# Patient Record
Sex: FEMALE | Race: OTHER | URBAN - METROPOLITAN AREA
[De-identification: names, ages, dates, MRNs, and addresses within clinical notes are randomized per-mention and may not be internally consistent; named-entity substitution may affect disease eponyms.]

---

## 2021-05-19 ENCOUNTER — OFFICE VISIT (OUTPATIENT)
Dept: PRIMARY CARE CLINIC | Age: 61
End: 2021-05-19

## 2021-05-19 VITALS
DIASTOLIC BLOOD PRESSURE: 68 MMHG | BODY MASS INDEX: 22.86 KG/M2 | SYSTOLIC BLOOD PRESSURE: 114 MMHG | OXYGEN SATURATION: 99 % | WEIGHT: 108.9 LBS | HEIGHT: 58 IN | HEART RATE: 76 BPM

## 2021-05-19 DIAGNOSIS — Z78.0 MENOPAUSE: ICD-10-CM

## 2021-05-19 DIAGNOSIS — K14.9 TONGUE DISORDER: Primary | ICD-10-CM

## 2021-05-19 DIAGNOSIS — M81.0 AGE-RELATED OSTEOPOROSIS WITHOUT CURRENT PATHOLOGICAL FRACTURE: ICD-10-CM

## 2021-05-19 PROCEDURE — 99203 OFFICE O/P NEW LOW 30 MIN: CPT | Performed by: FAMILY MEDICINE

## 2021-05-19 SDOH — ECONOMIC STABILITY: FOOD INSECURITY: WITHIN THE PAST 12 MONTHS, YOU WORRIED THAT YOUR FOOD WOULD RUN OUT BEFORE YOU GOT MONEY TO BUY MORE.: NEVER TRUE

## 2021-05-19 ASSESSMENT — ENCOUNTER SYMPTOMS
BACK PAIN: 0
CHEST TIGHTNESS: 0
WHEEZING: 0
COUGH: 0

## 2021-05-19 ASSESSMENT — PATIENT HEALTH QUESTIONNAIRE - PHQ9
SUM OF ALL RESPONSES TO PHQ QUESTIONS 1-9: 0
1. LITTLE INTEREST OR PLEASURE IN DOING THINGS: 0

## 2021-05-19 ASSESSMENT — SOCIAL DETERMINANTS OF HEALTH (SDOH): HOW HARD IS IT FOR YOU TO PAY FOR THE VERY BASICS LIKE FOOD, HOUSING, MEDICAL CARE, AND HEATING?: NOT HARD AT ALL

## 2021-05-19 NOTE — PROGRESS NOTES
SUBJECTIVE:  Patient ID: Leni Menjivar is a 62 y.o. female. Chief Complaint:  Chief Complaint   Patient presents with    Oral Pain     tongue pain x 2 wks increasing       HPI   62year old Female  First Time to [facility]  In with [address] [place] [address] of burning in her tongue x couple of month  Pain in her tongue when talk & when she eat  Tongue issue started after annual dental cleaning visit a year ago  No weight loss  Taste is fine   Smell is fine  No change in bowel habit   No sense of mucous   Hx Osteoporosis on weekly therapy    Current Outpatient Medications   Medication Sig Dispense Refill    Multiple Vitamin (MULTI-VITAMIN DAILY PO) Take by mouth daily      CALCIUM PO Take 600 mg by mouth daily       No current facility-administered medications for this visit. No results found for: WBC, HGB, HCT, MCV, PLT  No results found for: CHOL  No results found for: TRIG  No results found for: HDL  No results found for: LDLCHOLESTEROL, LDLCALC  No results found for: LABVLDL, VLDL  No results found for: CHOLHDLRATIO    Chemistry    No results found for: NA, K, CL, CO2, BUN, CREATININE No results found for: CALCIUM, ALKPHOS, AST, ALT, BILITOT         Review of Systems   Eyes:        Dry   Respiratory: Negative for cough, chest tightness and wheezing. Short of breath when walk long distance   Cardiovascular: Negative for chest pain, palpitations and leg swelling. Gastrointestinal:        No colonoscopy  No EGD  BM normal  Pure Vegetarian  denies any sense of acid or Reflux   Genitourinary: Negative for dysuria. Menopause age 49  No Mammogram  Last pelvic 2019 [facility]   Musculoskeletal: Negative for back pain and gait problem. Knee pain  Osteoporosis  Pain upper back & Both shoulder   Skin: Negative for rash. Neurological: Negative for dizziness and headaches. Psychiatric/Behavioral: Negative for behavioral problems.         Sleep 5-6 H  Good energy         OBJECTIVE:  /68 (Site: Right Upper Arm, Position: Sitting, Cuff Size: Medium Adult)   Pulse 76   Ht 4' 9.5\" (1.461 m)   Wt 108 lb 14.4 oz (49.4 kg)   SpO2 99%   BMI 23.16 kg/m²   Physical Exam  HENT:      Ears:      Comments: Cerumen both ear     Mouth/Throat:      Comments: Tongue some whitish area both side   Eyes:      Extraocular Movements: Extraocular movements intact. Pupils: Pupils are equal, round, and reactive to light. Cardiovascular:      Rate and Rhythm: Normal rate and regular rhythm. Heart sounds: Normal heart sounds. Pulmonary:      Effort: Pulmonary effort is normal.      Breath sounds: Normal breath sounds. No wheezing or rhonchi. Abdominal:      Palpations: Abdomen is soft. Musculoskeletal:      Cervical back: Normal range of motion and neck supple. Right lower leg: No edema. Left lower leg: No edema. Neurological:      General: No focal deficit present. Mental Status: She is alert and oriented to person, place, and time. ASSESSMENT/PLAN:      Diagnosis Orders   1. Tongue disorder  [facility]   2. Age-related osteoporosis without current pathological fracture     3. Menopause       ?  Lichen Planus of tongue  May need tongue Biopsy  Consider new lab & HCV screen  Advice due Mammogram & Colonoscopy  Last lab done [facility] 1/2021 to request a copy from [facility]   Pt has travel insurance not sure if it's just for emergency or cover anything   according pt last lab were wnl January 2021  Visit 30 minutes

## 2021-06-03 ENCOUNTER — OFFICE VISIT (OUTPATIENT)
Dept: ENT CLINIC | Age: 61
End: 2021-06-03

## 2021-06-03 VITALS
DIASTOLIC BLOOD PRESSURE: 70 MMHG | WEIGHT: 109 LBS | HEIGHT: 57 IN | SYSTOLIC BLOOD PRESSURE: 110 MMHG | BODY MASS INDEX: 23.51 KG/M2

## 2021-06-03 DIAGNOSIS — K13.21 LEUKOPLAKIA OF TONGUE: ICD-10-CM

## 2021-06-03 DIAGNOSIS — K14.0 GLOSSITIS: Primary | ICD-10-CM

## 2021-06-03 PROCEDURE — 99204 OFFICE O/P NEW MOD 45 MIN: CPT | Performed by: OTOLARYNGOLOGY

## 2021-06-03 ASSESSMENT — ENCOUNTER SYMPTOMS
SORE THROAT: 0
SINUS PAIN: 0
COUGH: 0
DIARRHEA: 0
NAUSEA: 0
SINUS PRESSURE: 0
BLOOD IN STOOL: 0
EYE ITCHING: 0
CHOKING: 0
TROUBLE SWALLOWING: 0
VOICE CHANGE: 0
EYE DISCHARGE: 0
VOMITING: 0
COLOR CHANGE: 0
STRIDOR: 0
BACK PAIN: 0
WHEEZING: 0
CONSTIPATION: 0
RHINORRHEA: 0
SHORTNESS OF BREATH: 0
FACIAL SWELLING: 0
PHOTOPHOBIA: 0

## 2021-06-03 NOTE — PROGRESS NOTES
used   Substance and Sexual Activity    Alcohol use: Not Currently    Drug use: Never    Sexual activity: Not on file   Other Topics Concern    Not on file   Social History Narrative            3 grown children    No Tobacco    No Alcohol     Social Determinants of Health     Financial Resource Strain: Low Risk     Difficulty of Paying Living Expenses: Not hard at all   Food Insecurity: No Food Insecurity    Worried About Running Out of Food in the Last Year: Never true    [address] in the Last Year: Never true   Transportation Needs:     Lack of Transportation (Medical):  Lack of Transportation (Non-Medical):    Physical Activity:     Days of Exercise per Week:     Minutes of Exercise per Session:    Stress:     Feeling of Stress :    Social Connections:     Frequency of Communication with Friends and Family:     Frequency of Social Gatherings with Friends and Family:     Attends Congregation Services:     Active Member of Clubs or Organizations:     Attends Club or Organization Meetings:     Marital Status:    Intimate Partner Violence:     Fear of Current or Ex-Partner:     Emotionally Abused:     Physically Abused:     Sexually Abused: Allergies     No Known Allergies    Medications     Current Outpatient Medications   Medication Sig Dispense Refill    Magic Mouthwash (MIRACLE MOUTHWASH) Lidocaine Viscous 2% Soln 24 ml; Diphenhydramine 12.5mg/5ml 120 mg 48ml; Nystatin 810973 units/ml 4,800,000 units 48 ml; prednisolone 15mg/5ml 46ml; 5 ml swish and spit 4 times per day 146 mL 1    Multiple Vitamin (MULTI-VITAMIN DAILY PO) Take by mouth daily      CALCIUM PO Take 600 mg by mouth daily       No current facility-administered medications for this visit. Review of Systems     Review of Systems   Constitutional: Negative for activity change, appetite change, chills, diaphoresis, fatigue, fever and unexpected weight change. HENT: Positive for mouth sores. Negative for congestion, dental problem, drooling, ear discharge, ear pain, facial swelling, hearing loss, nosebleeds, postnasal drip, rhinorrhea, sinus pressure, sinus pain, sneezing, sore throat, tinnitus, trouble swallowing and voice change. Eyes: Negative for photophobia, discharge, itching and visual disturbance. Respiratory: Negative for cough, choking, shortness of breath, wheezing and stridor. Gastrointestinal: Negative for blood in stool, constipation, diarrhea, nausea and vomiting. Endocrine: Negative for cold intolerance, heat intolerance, polyphagia and polyuria. Musculoskeletal: Negative for back pain, gait problem, neck pain and neck stiffness. Skin: Negative for color change, pallor, rash and wound. Neurological: Negative for dizziness, syncope, facial asymmetry, speech difficulty, light-headedness, numbness and headaches. Hematological: Negative for adenopathy. Does not bruise/bleed easily. Psychiatric/Behavioral: Negative for agitation, confusion and sleep disturbance. PhysicalExam     Vitals:    06/03/21 0905   BP: 110/70       Physical Exam  Constitutional:       Appearance: She is well-developed. HENT:      Head: Normocephalic and atraumatic. Not macrocephalic and not microcephalic. No raccoon eyes, Gooden's sign, abrasion, contusion, right periorbital erythema, left periorbital erythema or laceration. Hair is normal.      Jaw: No trismus. Right Ear: Hearing, tympanic membrane and external ear normal. No decreased hearing noted. No drainage, swelling or tenderness. No middle ear effusion. No mastoid tenderness. Tympanic membrane is not perforated, retracted or bulging. Tympanic membrane has normal mobility. Left Ear: Hearing, tympanic membrane and external ear normal. No decreased hearing noted. No drainage, swelling or tenderness. No middle ear effusion. No mastoid tenderness. Tympanic membrane is not perforated, retracted or bulging.  Tympanic membrane or rash. Neurological:      Mental Status: She is alert and oriented to person, place, and time. Cranial Nerves: No cranial nerve deficit. Psychiatric:         Speech: Speech normal.         Behavior: Behavior normal.           Procedure           Assessment and Plan     1. Glossitis  Patient has some small patches of leukoplakia and glossitis. The etiology of her symptoms is unclear. We will do a trial of the Magic mouthwash for 2 weeks. I will see her back in 3 weeks to assess improvement. If she is experiencing persistent symptoms, will likely perform biopsy. Concern for malignancy relatively low at this point given the low risk factors and appearance of the lesion, however cannot rule out unless definitive biopsy performed. - Magic Mouthwash (MIRACLE MOUTHWASH); Lidocaine Viscous 2% Soln 24 ml; Diphenhydramine 12.5mg/5ml 120 mg 48ml; Nystatin 811037 units/ml 4,800,000 units 48 ml; prednisolone 15mg/5ml 46ml; 5 ml swish and spit 4 times per day  Dispense: 146 mL; Refill: 1     2. Leukoplakia of tongue        Return in about 3 weeks (around 6/24/2021). Outside medical records reviewed. Outside laboratory results reviewed. Information obtained via  and son. Explained proper administration of medication. Patient and family member understand course of therapy. Portions of this note were dictated using Dragon.  There may be linguistic errors secondary to the use of this program.

## 2021-06-24 ENCOUNTER — OFFICE VISIT (OUTPATIENT)
Dept: ENT CLINIC | Age: 61
End: 2021-06-24

## 2021-06-24 VITALS
WEIGHT: 109 LBS | DIASTOLIC BLOOD PRESSURE: 64 MMHG | BODY MASS INDEX: 23.51 KG/M2 | HEIGHT: 57 IN | SYSTOLIC BLOOD PRESSURE: 100 MMHG | TEMPERATURE: 97.7 F

## 2021-06-24 DIAGNOSIS — K14.0 GLOSSITIS: Primary | ICD-10-CM

## 2021-06-24 DIAGNOSIS — K13.21 LEUKOPLAKIA OF TONGUE: ICD-10-CM

## 2021-06-24 PROCEDURE — 41100 BIOPSY OF TONGUE: CPT | Performed by: OTOLARYNGOLOGY

## 2021-06-24 PROCEDURE — 99213 OFFICE O/P EST LOW 20 MIN: CPT | Performed by: OTOLARYNGOLOGY

## 2021-06-24 ASSESSMENT — ENCOUNTER SYMPTOMS
SINUS PRESSURE: 0
VOICE CHANGE: 0
SHORTNESS OF BREATH: 0
COLOR CHANGE: 0
EYE ITCHING: 0
SINUS PAIN: 0
RHINORRHEA: 0
CHOKING: 0
EYE REDNESS: 0
PHOTOPHOBIA: 0
NAUSEA: 0
DIARRHEA: 0
TROUBLE SWALLOWING: 0
STRIDOR: 0
SORE THROAT: 0
EYE PAIN: 0
COUGH: 0
FACIAL SWELLING: 0

## 2021-06-24 NOTE — PROGRESS NOTES
[facility]  [address] [address] [facility], [address] [place] [address], [facility] [address]  P: [phone]  F: [phone]       Patient     Cammy Moralez  1960    ChiefComplaint     Chief Complaint   Patient presents with    Follow-up     Patient is here today for her 3 week follow up, there has been no change and the magic wash did not help as well       History of Present Illness     Cammy Moralez is a pleasant 62 y.o. female here for 3-week follow-up mouth pain, glossitis and leukoplakia. She completed the Magic mouthwash as instructed. She did not experience any improvement of symptoms. She now states she is also experiencing pain in her cheeks and her gums. She is here today for biopsy. Past Medical History     No past medical history on file. Past Surgical History     No past surgical history on file.     Family History     Family History   Problem Relation Age of Onset   Darcy Macie Other Mother         age 79    Other Father         age [de-identified]    Diabetes Sister     Diabetes Brother        Social History     Social History     Socioeconomic History    Marital status:      Spouse name: Not on file    Number of children: Not on file    Years of education: Not on file    Highest education level: Not on file   Occupational History    Not on file   Tobacco Use    Smoking status: Never Smoker    Smokeless tobacco: Never Used   Vaping Use    Vaping Use: Never used   Substance and Sexual Activity    Alcohol use: Not Currently    Drug use: Never    Sexual activity: Not on file   Other Topics Concern    Not on file   Social History Narrative            3 grown children    No Tobacco    No Alcohol     Social Determinants of Health     Financial Resource Strain: Low Risk     Difficulty of Paying Living Expenses: Not hard at all   Food Insecurity: No Food Insecurity    Worried About [facility] in the Last Year: Never true    [address] [facility] in the Last Year: Never true Transportation Needs:     Lack of Transportation (Medical):  Lack of Transportation (Non-Medical):    Physical Activity:     Days of Exercise per Week:     Minutes of Exercise per Session:    Stress:     Feeling of Stress :    Social Connections:     Frequency of Communication with Friends and Family:     Frequency of Social Gatherings with Friends and Family:     Attends Christianity Services:     Active Member of Clubs or Organizations:     Attends Club or Organization Meetings:     Marital Status:    Intimate Partner Violence:     Fear of Current or Ex-Partner:     Emotionally Abused:     Physically Abused:     Sexually Abused: Allergies     No Known Allergies    Medications     Current Outpatient Medications   Medication Sig Dispense Refill    Magic Mouthwash (MIRACLE MOUTHWASH) Lidocaine Viscous 2% Soln 24 ml; Diphenhydramine 12.5mg/5ml 120 mg 48ml; Nystatin 637143 units/ml 4,800,000 units 48 ml; prednisolone 15mg/5ml 46ml; 5 ml swish and spit 4 times per day 146 mL 1    Multiple Vitamin (MULTI-VITAMIN DAILY PO) Take by mouth daily      CALCIUM PO Take 600 mg by mouth daily       No current facility-administered medications for this visit. Review of Systems     Review of Systems   Constitutional: Negative for chills, fatigue and fever. HENT: Positive for dental problem and mouth sores. Negative for congestion, ear discharge, ear pain, facial swelling, hearing loss, nosebleeds, postnasal drip, rhinorrhea, sinus pressure, sinus pain, sneezing, sore throat, tinnitus, trouble swallowing and voice change. Eyes: Negative for photophobia, pain, redness, itching and visual disturbance. Respiratory: Negative for cough, choking, shortness of breath and stridor. Gastrointestinal: Negative for diarrhea and nausea. Musculoskeletal: Negative for neck pain and neck stiffness. Skin: Negative for color change and rash.    Neurological: Negative for dizziness, facial asymmetry and light-headedness. Hematological: Negative for adenopathy. Psychiatric/Behavioral: Negative for agitation and confusion. PhysicalExam     Vitals:    06/24/21 0933   BP: 100/64   Temp: 97.7 °F (36.5 °C)       Physical Exam  Constitutional:       Appearance: She is well-developed. HENT:      Head: Normocephalic and atraumatic. Not macrocephalic and not microcephalic. No raccoon eyes, Gooden's sign, abrasion, contusion, right periorbital erythema, left periorbital erythema or laceration. Hair is normal.      Jaw: No trismus. Right Ear: Hearing, tympanic membrane and external ear normal. No decreased hearing noted. No drainage, swelling or tenderness. No middle ear effusion. No mastoid tenderness. Tympanic membrane is not perforated, retracted or bulging. Tympanic membrane has normal mobility. Left Ear: Hearing, tympanic membrane and external ear normal. No decreased hearing noted. No drainage, swelling or tenderness. No middle ear effusion. No mastoid tenderness. Tympanic membrane is not perforated, retracted or bulging. Tympanic membrane has normal mobility. Nose: No nasal deformity, septal deviation, laceration, mucosal edema or rhinorrhea. Right Sinus: No maxillary sinus tenderness or frontal sinus tenderness. Left Sinus: No maxillary sinus tenderness or frontal sinus tenderness. Mouth/Throat:      Mouth: Mucous membranes are not pale, not dry and not cyanotic. No lacerations or oral lesions. Dentition: Normal dentition. No dental caries or dental abscesses. Pharynx: Uvula midline. No oropharyngeal exudate, posterior oropharyngeal erythema or uvula swelling. Tonsils: No tonsillar abscesses. Eyes:      General: Lids are normal.         Right eye: No discharge. Left eye: No discharge. Extraocular Movements:      Right eye: Normal extraocular motion and no nystagmus. Left eye: Normal extraocular motion and no nystagmus. Conjunctiva/sclera:      Right eye: No chemosis or exudate. Left eye: No chemosis or exudate. Neck:      Thyroid: No thyroid mass or thyromegaly. Vascular: Normal carotid pulses. Trachea: No tracheal tenderness or tracheal deviation. Cardiovascular:      Rate and Rhythm: Normal rate and regular rhythm. Pulmonary:      Effort: No tachypnea, bradypnea or respiratory distress. Breath sounds: No stridor. Musculoskeletal:      Right shoulder: Normal range of motion. Cervical back: Neck supple. Lymphadenopathy:      Head:      Right side of head: No submental, submandibular, tonsillar, preauricular, posterior auricular or occipital adenopathy. Left side of head: No submental, submandibular, tonsillar, preauricular, posterior auricular or occipital adenopathy. Cervical: No cervical adenopathy. Right cervical: No superficial, deep or posterior cervical adenopathy. Left cervical: No superficial, deep or posterior cervical adenopathy. Skin:     General: Skin is warm and dry. Findings: No bruising, erythema, laceration, lesion or rash. Neurological:      Mental Status: She is alert and oriented to person, place, and time. Cranial Nerves: No cranial nerve deficit. Psychiatric:         Speech: Speech normal.         Behavior: Behavior normal.           Procedure     Biopsy oral tongue  Preop diagnosis: Leukoplakia  Postop diagnosis: Same  Anesthesia: 1% lidocaine with 1 100,000 epinephrine  Consent: Verbal obtained  Surgeon: Gino Gil DO  Description:  Discussion of risks and benefits was performed. Verbal consent obtained. The lesion in question was anesthetized with lidocaine with epinephrine. A proper timeout was performed. A 4 mm punch biopsy was then used to make a circular incision in the lesion. The lesion was grasped with a pickup and cut at the base with a scissors. The specimen was placed in a formalin solution specimen cup.   The biopsy site was then cauterized with silver nitrate. Hemostasis achieved. Patient tolerated procedure well. There were no complications with the procedure. Assessment and Plan     1. Glossitis      2. Leukoplakia of tongue  Patient with persistent mouth pain. Persistent leukoplakia. Biopsy performed in the office today without difficulty. When results return, will contact patient. If biopsy reveals no malignancy, will consider referral to [facility]. Return if symptoms worsen or fail to improve. Portions of this note were dictated using Dragon.  There may be linguistic errors secondary to the use of this program.

## 2021-06-30 ENCOUNTER — TELEPHONE (OUTPATIENT)
Dept: ENT CLINIC | Age: 61
End: 2021-06-30

## 2021-06-30 NOTE — TELEPHONE ENCOUNTER
Patient's son is calling for the results of biopsy.   Patient is still having some pain at the biopsy site

## 2021-07-01 ENCOUNTER — TELEPHONE (OUTPATIENT)
Dept: ENT CLINIC | Age: 61
End: 2021-07-01

## 2021-07-01 NOTE — TELEPHONE ENCOUNTER
----- Message from Chandu Patel DO sent at 7/1/2021  9:58 AM EDT -----  Biopsy shows no evidence of cancer. It shows something called lichenoid mucositis. Follow up next week to assess healing of biopsy site.

## 2021-07-02 ENCOUNTER — OFFICE VISIT (OUTPATIENT)
Dept: ENT CLINIC | Age: 61
End: 2021-07-02

## 2021-07-02 VITALS
BODY MASS INDEX: 23.51 KG/M2 | HEART RATE: 82 BPM | SYSTOLIC BLOOD PRESSURE: 108 MMHG | HEIGHT: 57 IN | DIASTOLIC BLOOD PRESSURE: 72 MMHG | TEMPERATURE: 98.6 F | WEIGHT: 109 LBS

## 2021-07-02 DIAGNOSIS — K13.21 LEUKOPLAKIA OF TONGUE: ICD-10-CM

## 2021-07-02 DIAGNOSIS — K14.0 GLOSSITIS: Primary | ICD-10-CM

## 2021-07-02 PROCEDURE — 99214 OFFICE O/P EST MOD 30 MIN: CPT | Performed by: OTOLARYNGOLOGY

## 2021-07-02 RX ORDER — LIDOCAINE HYDROCHLORIDE 20 MG/ML
15 SOLUTION OROPHARYNGEAL
Qty: 100 ML | Refills: 2 | Status: SHIPPED | OUTPATIENT
Start: 2021-07-02

## 2021-07-02 ASSESSMENT — ENCOUNTER SYMPTOMS
EYE REDNESS: 0
DIARRHEA: 0
COUGH: 0
SINUS PAIN: 0
SORE THROAT: 0
NAUSEA: 0
SHORTNESS OF BREATH: 0
COLOR CHANGE: 0
FACIAL SWELLING: 0
EYE ITCHING: 0
TROUBLE SWALLOWING: 0
STRIDOR: 0
CHOKING: 0
PHOTOPHOBIA: 0
EYE PAIN: 0
VOICE CHANGE: 0
RHINORRHEA: 0
SINUS PRESSURE: 0

## 2021-07-02 NOTE — PROGRESS NOTES
[facility]  [address] [address] [facility], [address] [place] [address] Ave  P: [phone]  F: [phone]       Patient     Renu Chan  1960    ChiefComplaint     Chief Complaint   Patient presents with    Follow-up     Patient is here today because she is having pain at the biopsy site       History of Present Illness     Renu Chan is a pleasant 62 y.o. female here with an  and her son for follow-up for biopsy of the oral tongue. Biopsy results reveal lichenoid mucositis, most likely consistent with lichen planus. Magic mouthwash did not help her symptoms. She is having pain at the biopsy site. No evidence of malignancy on biopsy. Past Medical History     No past medical history on file. Past Surgical History     No past surgical history on file.     Family History     Family History   Problem Relation Age of Onset   Laverne Herron Other Mother         age 79    Other Father         age [de-identified]    Diabetes Sister     Diabetes Brother        Social History     Social History     Socioeconomic History    Marital status:      Spouse name: Not on file    Number of children: Not on file    Years of education: Not on file    Highest education level: Not on file   Occupational History    Not on file   Tobacco Use    Smoking status: Never Smoker    Smokeless tobacco: Never Used   Vaping Use    Vaping Use: Never used   Substance and Sexual Activity    Alcohol use: Not Currently    Drug use: Never    Sexual activity: Not on file   Other Topics Concern    Not on file   Social History Narrative            3 grown children    No Tobacco    No Alcohol     Social Determinants of Health     Financial Resource Strain: Low Risk     Difficulty of Paying Living Expenses: Not hard at all   Food Insecurity: No Food Insecurity    Worried About Running Out of Food in the Last Year: Never true    [address] in the Last Year: Never true   Transportation Needs:     Lack of Transportation (Medical):  Lack of Transportation (Non-Medical):    Physical Activity:     Days of Exercise per Week:     Minutes of Exercise per Session:    Stress:     Feeling of Stress :    Social Connections:     Frequency of Communication with Friends and Family:     Frequency of Social Gatherings with Friends and Family:     Attends Restorationist Services:     Active Member of Clubs or Organizations:     Attends Club or Organization Meetings:     Marital Status:    Intimate Partner Violence:     Fear of Current or Ex-Partner:     Emotionally Abused:     Physically Abused:     Sexually Abused: Allergies     No Known Allergies    Medications     Current Outpatient Medications   Medication Sig Dispense Refill    lidocaine viscous hcl (XYLOCAINE) 2 % SOLN solution Take 15 mLs by mouth every 3 hours as needed for Irritation or Pain 100 mL 2    Magic Mouthwash (MIRACLE MOUTHWASH) Lidocaine Viscous 2% Soln 24 ml; Diphenhydramine 12.5mg/5ml 120 mg 48ml; Nystatin 769327 units/ml 4,800,000 units 48 ml; prednisolone 15mg/5ml 46ml; 5 ml swish and spit 4 times per day 146 mL 1    Multiple Vitamin (MULTI-VITAMIN DAILY PO) Take by mouth daily      CALCIUM PO Take 600 mg by mouth daily       No current facility-administered medications for this visit. Review of Systems     Review of Systems   Constitutional: Negative for chills, fatigue and fever. HENT: Positive for mouth sores. Negative for congestion, ear discharge, ear pain, facial swelling, hearing loss, nosebleeds, postnasal drip, rhinorrhea, sinus pressure, sinus pain, sneezing, sore throat, tinnitus, trouble swallowing and voice change. Eyes: Negative for photophobia, pain, redness, itching and visual disturbance. Respiratory: Negative for cough, choking, shortness of breath and stridor. Gastrointestinal: Negative for diarrhea and nausea. Musculoskeletal: Negative for neck pain and neck stiffness.    Skin: Negative for color change and rash. Neurological: Negative for dizziness, facial asymmetry and light-headedness. Hematological: Negative for adenopathy. Psychiatric/Behavioral: Negative for agitation and confusion. PhysicalExam     Vitals:    07/02/21 1150   BP: 108/72   Pulse: 82   Temp: 98.6 °F (37 °C)       Physical Exam  Constitutional:       Appearance: She is well-developed. HENT:      Head: Normocephalic and atraumatic. Jaw: No trismus. Right Ear: Tympanic membrane, ear canal and external ear normal. No drainage. No middle ear effusion. Tympanic membrane is not perforated. Left Ear: Tympanic membrane, ear canal and external ear normal. No drainage. No middle ear effusion. Tympanic membrane is not perforated. Nose: No septal deviation, mucosal edema or rhinorrhea. Mouth/Throat:      Dentition: Normal dentition. Pharynx: Uvula midline. No oropharyngeal exudate. Eyes:      General: No scleral icterus. Right eye: No discharge. Left eye: No discharge. Pupils: Pupils are equal, round, and reactive to light. Neck:      Thyroid: No thyromegaly. Trachea: Phonation normal. No tracheal deviation. Pulmonary:      Effort: Pulmonary effort is normal. No respiratory distress. Breath sounds: No stridor. Musculoskeletal:      Cervical back: Neck supple. Lymphadenopathy:      Cervical: No cervical adenopathy. Skin:     General: Skin is warm and dry. Neurological:      Mental Status: She is alert and oriented to person, place, and time. Cranial Nerves: No cranial nerve deficit. Psychiatric:         Behavior: Behavior normal.           Procedure           Assessment and Plan     1. Glossitis  Patient here for follow-up for biopsy of tongue. Tongue biopsy results revealed lichenoid mucositis, with differentials including oral lichen planus, allergic contact mucositis, or drug-related eruption. No evidence of dysplasia noted.   Will refer to [facility] Healy for further management. Provided her with a prescription for viscous lidocaine to help ease her pain. Proper administration of medication discussed the patient. Also recommended over-the-counter ibuprofen. - External Referral To Dermatology    2. Leukoplakia of tongue    - External Referral To Dermatology      Return if symptoms worsen or fail to improve. Portions of this note were dictated using Dragon.  There may be linguistic errors secondary to the use of this program.